# Patient Record
Sex: FEMALE | Race: WHITE | Employment: FULL TIME | URBAN - METROPOLITAN AREA
[De-identification: names, ages, dates, MRNs, and addresses within clinical notes are randomized per-mention and may not be internally consistent; named-entity substitution may affect disease eponyms.]

---

## 2019-11-15 ENCOUNTER — OFFICE VISIT (OUTPATIENT)
Dept: FAMILY MEDICINE CLINIC | Facility: CLINIC | Age: 24
End: 2019-11-15

## 2019-11-15 VITALS
RESPIRATION RATE: 16 BRPM | TEMPERATURE: 98.3 F | HEIGHT: 64 IN | DIASTOLIC BLOOD PRESSURE: 74 MMHG | SYSTOLIC BLOOD PRESSURE: 108 MMHG | BODY MASS INDEX: 30.85 KG/M2 | HEART RATE: 110 BPM | WEIGHT: 180.7 LBS | OXYGEN SATURATION: 98 %

## 2019-11-15 DIAGNOSIS — R53.82 CHRONIC FATIGUE: ICD-10-CM

## 2019-11-15 DIAGNOSIS — R73.9 HYPERGLYCEMIA: ICD-10-CM

## 2019-11-15 DIAGNOSIS — Z23 NEED FOR VACCINATION: ICD-10-CM

## 2019-11-15 DIAGNOSIS — Z00.00 ANNUAL PHYSICAL EXAM: Primary | ICD-10-CM

## 2019-11-15 DIAGNOSIS — R63.5 WEIGHT GAIN: ICD-10-CM

## 2019-11-15 PROCEDURE — 90674 CCIIV4 VAC NO PRSV 0.5 ML IM: CPT | Performed by: FAMILY MEDICINE

## 2019-11-15 PROCEDURE — 99204 OFFICE O/P NEW MOD 45 MIN: CPT | Performed by: FAMILY MEDICINE

## 2019-11-15 PROCEDURE — 99385 PREV VISIT NEW AGE 18-39: CPT | Performed by: FAMILY MEDICINE

## 2019-11-15 PROCEDURE — 90471 IMMUNIZATION ADMIN: CPT | Performed by: FAMILY MEDICINE

## 2019-11-15 PROCEDURE — 90472 IMMUNIZATION ADMIN EACH ADD: CPT | Performed by: FAMILY MEDICINE

## 2019-11-15 PROCEDURE — 90649 4VHPV VACCINE 3 DOSE IM: CPT | Performed by: FAMILY MEDICINE

## 2019-11-15 RX ORDER — DULOXETIN HYDROCHLORIDE 60 MG/1
60 CAPSULE, DELAYED RELEASE ORAL DAILY
COMMUNITY
End: 2021-06-21 | Stop reason: SDUPTHER

## 2019-11-15 RX ORDER — NORETHINDRONE ACETATE AND ETHINYL ESTRADIOL AND FERROUS FUMARATE 5-7-9-7
KIT ORAL DAILY
COMMUNITY
End: 2021-06-21

## 2019-11-15 NOTE — PROGRESS NOTES
Subjective   Veronica Monroe is a 24 y.o. female.     Chief Complaint   Patient presents with   • Establish Care     np   • Annual Exam     CPE   • Weight Gain     pt cannot explain weight gain despite working out 4 days a week and eating healthy        History of Present Illness   Patient presents to become established with PCP.  She reports unexplained weight gain, for the past year, an estimated 30 lbs. She used to be around 150lbs. She also reports fatigue, which she has had problems with since high school.   She has an ob-gyn, Dr. Agrawal at [facility]- saw her last year, has not gone back yet since she didn't have insurance for awhile. Per patient, Ob-gyn mentioned she may have PCOS.   She saw Dr. Yeh at [facility], [place] this past Monday. She had a transvaginal and pelvic US. - Will obtain results. No cysts were observed, although patient reports ovarian cysts on previous ultrasounds.     She is on OCP's- for the past 6 months. This has helped with her menorrhagia, dysmenorrhea, mood swings. She takes the birth control so she doesn't have menstrual cycles. Pap test last year. LMP- may 2019 (intentionally, with birth control).    She sees a psychiatrist earlier this year- [facility], and had lab work done to investigate fatigue.   She has been on Cymbalta for the past 3 years, and reports being on medications for anxiety since high school.  Had lab work done in June.    Will obtain results from ob-gyn and psychiatrist for review.  She does not drink caffeine, for the past few months- because it seemed to aggravate her anxiety, and made it difficult for her to sleep at night.     She works as a , is at a desk most of the day.   She lives with her boyfriend at home.    Surgical history- tonsillectomy age 4-5yrs.    FH-  Mother- uterine fibroid  Maternal aunt- endometriosis, diabetes  Other maternal aunt- hysterectomy in her 40's (unknown why)  Maternal grandmother- mastectomy for  breast cancer  Maternal great- grandmother- breast cancer  Father and paternal grandfather- diabetes  Maternal grandfather-diabetes    She exercises- strength training/weight lifting 3-4 times per week 30-45 minutes, some cycling, also does rock climbing every few weeks.  Her diet is healthy, she eats chicken, fish, lots of vegetables. Limits bread, carbs. Avoids dairy for the past 2 months. Has been trying to drink more water- 32-48oz water/day.   Does not drink soft drinks or alcohol.  She has never been a smoker.     History reviewed. No pertinent past medical history.  History reviewed. No pertinent surgical history.  Social History     Tobacco Use   • Smoking status: Never Smoker   • Smokeless tobacco: Never Used   Substance Use Topics   • Alcohol use: Yes   • Drug use: Not on file     History reviewed. No pertinent family history.    Review of Systems   Constitutional: Positive for fatigue and unexpected weight gain. Negative for activity change, appetite change, fever and unexpected weight loss.   HENT: Positive for postnasal drip (In the fall), rhinorrhea (In the fall) and sinus pressure (In the fall). Negative for ear pain, hearing loss, sore throat and trouble swallowing.    Respiratory: Positive for chest tightness. Negative for shortness of breath and wheezing.    Cardiovascular: Negative for chest pain, palpitations and leg swelling.   Gastrointestinal: Positive for GERD (Seldom). Negative for abdominal pain, constipation (Occasionally), diarrhea, nausea and vomiting.   Endocrine: Negative for polydipsia and polyuria.   Genitourinary: Negative for difficulty urinating, dysuria, menstrual problem and vaginal discharge.   Musculoskeletal: Negative for arthralgias and back pain.   Allergic/Immunologic: Positive for environmental allergies (Sees an allergy doctor).   Neurological: Negative for dizziness and headache.   Psychiatric/Behavioral: Negative for sleep disturbance and depressed mood. The patient is  "not nervous/anxious.        Objective   /74   Pulse 110   Temp 98.3 °F (36.8 °C) (Oral)   Resp 16   Ht 161.3 cm (63.5\")   Wt 82 kg (180 lb 11.2 oz)   SpO2 98%   BMI 31.51 kg/m²     Physical Exam   Constitutional: She appears well-developed and well-nourished. No distress.   HENT:   Head: Normocephalic.   Right Ear: Tympanic membrane, external ear and ear canal normal.   Left Ear: External ear normal. An impacted cerumen is present.  Mouth/Throat: Oropharynx is clear and moist.   Mallampati score 4   Eyes: Conjunctivae and EOM are normal. Pupils are equal, round, and reactive to light.   Neck: Normal range of motion. Neck supple.   Cardiovascular: Normal rate, regular rhythm, normal heart sounds and intact distal pulses.   No murmur heard.  Pulmonary/Chest: Effort normal and breath sounds normal. No respiratory distress. She has no wheezes. She has no rales.   Abdominal: Soft. Bowel sounds are normal. She exhibits no distension. There is no tenderness. There is no rebound and no guarding.   Musculoskeletal: Normal range of motion.   Lymphadenopathy:     She has no cervical adenopathy.   Neurological: She is alert.   Skin: Skin is warm and dry. Capillary refill takes less than 2 seconds.   Psychiatric: She has a normal mood and affect.   Vitals reviewed.      Procedures    Assessment/Plan   Veronica was seen today for establish care, annual exam and weight gain.    Diagnoses and all orders for this visit:    Annual physical exam  -     Comprehensive Metabolic Panel; Future  -     Lipid Panel; Future  -     Lipid Panel  -     Comprehensive Metabolic Panel    Hyperglycemia  -     Hemoglobin A1c    Chronic fatigue  -     CBC & Differential; Future  -     TSH  -     CBC & Differential  -     Ambulatory Referral to Sleep Medicine    Weight gain  -     TSH    Need for vaccination  -     Flucelvax Quad=>4Years (Vial)  -     HPV Vaccine QuadriValent 3 Dose IM     Patient Instructions   Lab work ordered, will call " with results.  Will obtain medical records from previous physicians.  Return to clinic in 6 months, and as needed.

## 2019-11-15 NOTE — PATIENT INSTRUCTIONS
Patient Instructions   Lab work ordered, will call with results.  Will obtain medical records from previous physicians.  Return to clinic in 6 months, and as needed.

## 2019-11-16 LAB
ALBUMIN SERPL-MCNC: 4.4 G/DL (ref 3.5–5.2)
ALBUMIN/GLOB SERPL: 1.5 G/DL
ALP SERPL-CCNC: 69 U/L (ref 39–117)
ALT SERPL-CCNC: 10 U/L (ref 1–33)
AST SERPL-CCNC: 15 U/L (ref 1–32)
BASOPHILS # BLD AUTO: 0.06 10*3/MM3 (ref 0–0.2)
BASOPHILS NFR BLD AUTO: 0.7 % (ref 0–1.5)
BILIRUB SERPL-MCNC: 0.2 MG/DL (ref 0.2–1.2)
BUN SERPL-MCNC: 6 MG/DL (ref 6–20)
BUN/CREAT SERPL: 13 (ref 7–25)
CALCIUM SERPL-MCNC: 9.7 MG/DL (ref 8.6–10.5)
CHLORIDE SERPL-SCNC: 104 MMOL/L (ref 98–107)
CHOLEST SERPL-MCNC: 181 MG/DL (ref 0–200)
CO2 SERPL-SCNC: 24.2 MMOL/L (ref 22–29)
CREAT SERPL-MCNC: 0.46 MG/DL (ref 0.57–1)
EOSINOPHIL # BLD AUTO: 0.13 10*3/MM3 (ref 0–0.4)
EOSINOPHIL NFR BLD AUTO: 1.5 % (ref 0.3–6.2)
ERYTHROCYTE [DISTWIDTH] IN BLOOD BY AUTOMATED COUNT: 13.2 % (ref 12.3–15.4)
GLOBULIN SER CALC-MCNC: 3 GM/DL
GLUCOSE SERPL-MCNC: 73 MG/DL (ref 65–99)
HBA1C MFR BLD: 5.5 % (ref 4.8–5.6)
HCT VFR BLD AUTO: 39.4 % (ref 34–46.6)
HDLC SERPL-MCNC: 50 MG/DL (ref 40–60)
HGB BLD-MCNC: 12.8 G/DL (ref 12–15.9)
IMM GRANULOCYTES # BLD AUTO: 0.03 10*3/MM3 (ref 0–0.05)
IMM GRANULOCYTES NFR BLD AUTO: 0.4 % (ref 0–0.5)
LDLC SERPL CALC-MCNC: 112 MG/DL (ref 0–100)
LYMPHOCYTES # BLD AUTO: 2.39 10*3/MM3 (ref 0.7–3.1)
LYMPHOCYTES NFR BLD AUTO: 28.3 % (ref 19.6–45.3)
MCH RBC QN AUTO: 28 PG (ref 26.6–33)
MCHC RBC AUTO-ENTMCNC: 32.5 G/DL (ref 31.5–35.7)
MCV RBC AUTO: 86.2 FL (ref 79–97)
MONOCYTES # BLD AUTO: 0.45 10*3/MM3 (ref 0.1–0.9)
MONOCYTES NFR BLD AUTO: 5.3 % (ref 5–12)
NEUTROPHILS # BLD AUTO: 5.4 10*3/MM3 (ref 1.7–7)
NEUTROPHILS NFR BLD AUTO: 63.8 % (ref 42.7–76)
NRBC BLD AUTO-RTO: 0 /100 WBC (ref 0–0.2)
PLATELET # BLD AUTO: 468 10*3/MM3 (ref 140–450)
POTASSIUM SERPL-SCNC: 4.7 MMOL/L (ref 3.5–5.2)
PROT SERPL-MCNC: 7.4 G/DL (ref 6–8.5)
RBC # BLD AUTO: 4.57 10*6/MM3 (ref 3.77–5.28)
SODIUM SERPL-SCNC: 140 MMOL/L (ref 136–145)
TRIGL SERPL-MCNC: 94 MG/DL (ref 0–150)
TSH SERPL DL<=0.005 MIU/L-ACNC: 2.44 UIU/ML (ref 0.27–4.2)
VLDLC SERPL CALC-MCNC: 18.8 MG/DL
WBC # BLD AUTO: 8.46 10*3/MM3 (ref 3.4–10.8)

## 2019-11-18 NOTE — PROGRESS NOTES
Left message on patient's voicemail. Will await callback. Will plan to discuss ordering sleep study with patient and increased exercise intensity for weight loss.

## 2019-12-13 ENCOUNTER — OFFICE VISIT (OUTPATIENT)
Dept: FAMILY MEDICINE CLINIC | Facility: CLINIC | Age: 24
End: 2019-12-13

## 2019-12-13 VITALS
HEIGHT: 64 IN | HEART RATE: 112 BPM | WEIGHT: 182.7 LBS | RESPIRATION RATE: 16 BRPM | OXYGEN SATURATION: 98 % | DIASTOLIC BLOOD PRESSURE: 90 MMHG | BODY MASS INDEX: 31.19 KG/M2 | SYSTOLIC BLOOD PRESSURE: 128 MMHG | TEMPERATURE: 98.5 F

## 2019-12-13 DIAGNOSIS — K21.9 GASTROESOPHAGEAL REFLUX DISEASE, ESOPHAGITIS PRESENCE NOT SPECIFIED: ICD-10-CM

## 2019-12-13 DIAGNOSIS — E66.9 CLASS 1 OBESITY WITHOUT SERIOUS COMORBIDITY WITH BODY MASS INDEX (BMI) OF 31.0 TO 31.9 IN ADULT, UNSPECIFIED OBESITY TYPE: ICD-10-CM

## 2019-12-13 DIAGNOSIS — R00.0 SINUS TACHYCARDIA: Primary | ICD-10-CM

## 2019-12-13 PROCEDURE — 99214 OFFICE O/P EST MOD 30 MIN: CPT | Performed by: FAMILY MEDICINE

## 2019-12-13 NOTE — PROGRESS NOTES
Subjective   Veronica Monroe is a 24 y.o. female.     Chief Complaint   Patient presents with   • Follow-up     [facility] sinusitis tacycardia and bronchitis        History of Present Illness   Patient presents for  follow up. She was seen and evaluated yesterday- diagnosed with sinusitis and bronchitis for which she is taking doxycycline as directed.  She was also noted to have tachycardia and recommended to go to the ER- she went to [facility] ER and was evaluated with EKG, Chest-X-ray and lab work which were unremarkable aside from sinus tachycardia of 112 bpm. (Results reviewed).  Her HR has been elevated at previous apt one month ago. Patient denies symptoms- no CP, heart palpitations, dyspnea. She states she is an anxious person.  She reports GERD symptoms that have increased recently to 3 times per week. She has not taken anything for it.   She states that she eats a healthy diet overall, avoids sugary drinks, and exercises frequently, doing 20 minutes of cardio most days of the week.       History reviewed. No pertinent past medical history.  History reviewed. No pertinent surgical history.  Social History     Tobacco Use   • Smoking status: Never Smoker   • Smokeless tobacco: Never Used   Substance Use Topics   • Alcohol use: Yes   • Drug use: Not on file     History reviewed. No pertinent family history.    Review of Systems   Constitutional: Negative for activity change, appetite change, fatigue, fever, unexpected weight gain and unexpected weight loss.   HENT: Positive for postnasal drip, rhinorrhea and sinus pressure. Negative for ear pain, sore throat and trouble swallowing.    Respiratory: Positive for cough. Negative for choking, chest tightness and shortness of breath.    Cardiovascular: Negative for chest pain, palpitations and leg swelling.   Gastrointestinal: Positive for GERD. Negative for abdominal pain, diarrhea, nausea and vomiting.       Objective   /90   Pulse 112   Temp 98.5 °F (36.9 °C)  "(Oral)   Resp 16   Ht 161.3 cm (63.5\")   Wt 82.9 kg (182 lb 11.2 oz)   SpO2 98%   BMI 31.86 kg/m²     Physical Exam   Constitutional: She appears well-developed and well-nourished. No distress.   Pleasant, obese female   HENT:   Head: Normocephalic.   Right Ear: External ear normal.   Left Ear: External ear normal.   Mouth/Throat: Oropharynx is clear and moist.   Eyes: Pupils are equal, round, and reactive to light. Conjunctivae and EOM are normal.   Neck: Normal range of motion. Neck supple.   Cardiovascular: Regular rhythm, normal heart sounds and intact distal pulses.   No murmur heard.  Sinus tachycardia    Pulmonary/Chest: Effort normal and breath sounds normal. No respiratory distress. She has no wheezes. She has no rales.   Musculoskeletal: Normal range of motion.   Neurological: She is alert.   Skin: Skin is warm and dry. Capillary refill takes less than 2 seconds.   Psychiatric: She has a normal mood and affect.   Vitals reviewed.      Procedures    Assessment/Plan   Veronica was seen today for follow-up.    Diagnoses and all orders for this visit:    Sinus tachycardia  -     Holter monitor - 48 hour; Future    Class 1 obesity without serious comorbidity with body mass index (BMI) of 31.0 to 31.9 in adult, unspecified obesity type    Gastroesophageal reflux disease, esophagitis presence not specified        Discussed patient's BMI of 31.9 and the importance of increasing exercise intensity with goal of weight loss which will help improve GERD symptoms and elevated BP. Patient is agreeable with this plan.   Will also order 48 hour holter monitor, and consider cardiology referral.   TSH level last month was normal.        Patient Instructions    Will order 48 hour holter monitor  Recommend prilosec for GERD symptoms  Recommend increasing exercise intensity and frequency, spin classes would be excellent, with the goal of weight loss of approximately 30lbs   Return to clinic as needed, and in 3 months " for follow up

## 2019-12-13 NOTE — PATIENT INSTRUCTIONS
Patient Instructions    Will order 48 hour holter monitor  Recommend prilosec for GERD symptoms  Recommend increasing exercise intensity and frequency, spin classes would be excellent, with the goal of weight loss of approximately 30lbs   Return to clinic as needed, and in 3 months for follow up

## 2020-03-05 ENCOUNTER — APPOINTMENT (OUTPATIENT)
Dept: SLEEP MEDICINE | Facility: HOSPITAL | Age: 25
End: 2020-03-05

## 2021-06-21 ENCOUNTER — OFFICE VISIT (OUTPATIENT)
Dept: INTERNAL MEDICINE | Facility: CLINIC | Age: 26
End: 2021-06-21

## 2021-06-21 VITALS
HEIGHT: 64 IN | WEIGHT: 213.2 LBS | DIASTOLIC BLOOD PRESSURE: 62 MMHG | HEART RATE: 111 BPM | SYSTOLIC BLOOD PRESSURE: 118 MMHG | OXYGEN SATURATION: 99 % | BODY MASS INDEX: 36.4 KG/M2

## 2021-06-21 DIAGNOSIS — F41.1 GAD (GENERALIZED ANXIETY DISORDER): ICD-10-CM

## 2021-06-21 DIAGNOSIS — Z00.00 ANNUAL PHYSICAL EXAM: Primary | ICD-10-CM

## 2021-06-21 DIAGNOSIS — K21.9 GASTROESOPHAGEAL REFLUX DISEASE WITHOUT ESOPHAGITIS: ICD-10-CM

## 2021-06-21 DIAGNOSIS — F42.9 OBSESSIVE-COMPULSIVE DISORDER, UNSPECIFIED TYPE: ICD-10-CM

## 2021-06-21 DIAGNOSIS — E66.9 OBESITY, CLASS II, BMI 35-39.9, ISOLATED: ICD-10-CM

## 2021-06-21 PROBLEM — Z80.3 FAMILY HISTORY OF BREAST CANCER IN FIRST DEGREE RELATIVE: Status: ACTIVE | Noted: 2020-02-06

## 2021-06-21 PROCEDURE — 99395 PREV VISIT EST AGE 18-39: CPT | Performed by: FAMILY MEDICINE

## 2021-06-21 RX ORDER — FLUOXETINE HYDROCHLORIDE 20 MG/1
CAPSULE ORAL
COMMUNITY
End: 2021-06-21

## 2021-06-21 RX ORDER — CETIRIZINE HYDROCHLORIDE 10 MG/1
10 TABLET ORAL DAILY
COMMUNITY

## 2021-06-21 RX ORDER — DULOXETIN HYDROCHLORIDE 60 MG/1
60 CAPSULE, DELAYED RELEASE ORAL DAILY
Qty: 90 CAPSULE | Refills: 4 | Status: SHIPPED | OUTPATIENT
Start: 2021-06-21

## 2021-06-21 RX ORDER — NORETHINDRONE ACETATE/ETHINYL ESTRADIOL 1.5-0.03MG
KIT ORAL
COMMUNITY
Start: 2021-06-16 | End: 2021-06-21

## 2021-06-21 RX ORDER — LEVOCETIRIZINE DIHYDROCHLORIDE 5 MG/1
TABLET, FILM COATED ORAL
COMMUNITY
End: 2021-06-21

## 2021-06-21 RX ORDER — MULTIPLE VITAMINS W/ MINERALS TAB 9MG-400MCG
1 TAB ORAL DAILY
COMMUNITY

## 2021-06-21 NOTE — PROGRESS NOTES
Date of Encounter: 2021  Patient: Veronica Monroe,  1995    Subjective   History of Presenting Illness  Chief complaint: Annual physical    No acute concerns.    Generalized anxiety disorder, obsessive-compulsive disorder: Doing well on duloxetine, has been on it since 2017.  No significant side effects.  Feels her mood is doing very well and her symptoms are controlled on this regimen.  Did not find benefit in talk therapy.    History of GERD, with no current symptoms.    Lives with partner.  No children.  Never a smoker.  Does not drink alcohol.  Eats a plant-based diet.  Exercises 4 times per week primarily by power lifting.  Last cervical cancer screening 2021 at [facility].  On combined OCPs.  Works as a , also in school online.  Does not have a living will.  Has had HPV series per her report, up-to-date on COVID-19 vaccine -card not available, due for Td vaccination.    Review of Systems:  Negative for fever, congestion, chest pain upon exertion, shortness of breath, vision changes, vomiting, dysuria, lymphadenopathy, muscle weakness, numbness, mood changes, rashes.    The following portions of the patient's history were reviewed and updated as appropriate: allergies, current medications, past family history, past medical history, past social history, past surgical history and problem list.    Patient Active Problem List   Diagnosis   • Family history of breast cancer in first degree relative   • JOSE (generalized anxiety disorder)   • OCD (obsessive compulsive disorder)   • Gastroesophageal reflux disease without esophagitis   • Obesity, Class II, BMI 35-39.9, isolated     Past Medical History:   Diagnosis Date   • Abnormal Pap smear of cervix     only at 20 y/o   • Anxiety    • Panic disorder    • Seasonal allergies      Past Surgical History:   Procedure Laterality Date   • TONSILLECTOMY       Family History   Problem Relation Age of Onset   • Endometriosis Mother    •  "Diabetes Father    • Depression Sister    • Endometriosis Maternal Aunt    • Breast cancer Maternal Grandmother    • Diabetes Maternal Grandfather    • Stroke Maternal Grandfather    • Diabetes Paternal Grandfather        Current Outpatient Medications:   •  cetirizine (zyrTEC) 10 MG tablet, Take 10 mg by mouth Daily., Disp: , Rfl:   •  DULoxetine (CYMBALTA) 60 MG capsule, Take 1 capsule by mouth Daily., Disp: 90 capsule, Rfl: 4  •  FLUTICASONE PROPIONATE EX, Apply  topically., Disp: , Rfl:   •  multivitamin with minerals (MULTIVITAMIN ADULTS PO), Take 1 tablet by mouth Daily., Disp: , Rfl:   •  Norethin Ace-Eth Estrad-FE (MICROGESTIN 24 FE PO), Take  by mouth., Disp: , Rfl:   •  Omega-3 Fatty Acids (OMEGA-3 PO), Take  by mouth., Disp: , Rfl:   Allergies   Allergen Reactions   • Penicillins Rash     Social History     Tobacco Use   • Smoking status: Never Smoker   • Smokeless tobacco: Never Used   Substance Use Topics   • Alcohol use: Yes   • Drug use: Not on file          Objective   Physical Exam  Vitals:    06/21/21 1456   BP: 118/62   Pulse: 111   SpO2: 99%   Weight: 96.7 kg (213 lb 3.2 oz)   Height: 162.6 cm (64\")     Body mass index is 36.6 kg/m².    Constitutional: NAD.  Eyes: EOMI. PERRLA. Normal conjunctiva.  Ear, nose, mouth, throat: No tonsillar exudates or erythema.   Normal nasal mucosa. Normal external ear canals and TMs bilaterally.  Cardiovascular: RRR. No murmurs. No LE edema b/l. Radial pulses 2+ bilaterally.  Pulmonary: CTA b/l. Good effort.  Integumentary: No rashes or wounds on face or upper extremities.  Lymphatic: No anterior cervical lymphadenopathy.  Endocrine: No thyromegaly or palpable thyroid nodules.  Psychiatric: Normal affect. Normal thought content.  Good insight and judgment.     Assessment/Plan   Assessment and Plan  Pleasant 26-year-old female with generalized anxiety disorder, obsessive-compulsive disorder, history of GERD, family history of breast cancer in first-degree relative, " who presents with the following:    Diagnoses and all orders for this visit:    1. Annual physical exam (Primary): We discussed preventative care including age and patient-appropriate immunizations and cancer screening. We also discussed the importance of exercise and a healthy diet. This is their annual preventative exam.  I recommended Td vaccination when financially feasible.    2. JOSE (generalized anxiety disorder): Controlled, continue current regimen  3. Obsessive-compulsive disorder, unspecified type  -     DULoxetine (CYMBALTA) 60 MG capsule; Take 1 capsule by mouth Daily.  Dispense: 90 capsule; Refill: 4    4. Gastroesophageal reflux disease without esophagitis: Resolved    5. Obesity, Class II, BMI 35-39.9, isolated: Patient reports recent blood work within the past year which demonstrated normal blood glucose, lipids, kidneys, and liver function tests.  She exercises by power lifting and has no history of hyperlipidemia, diabetes, or hypertension.  I do think that weight loss would be beneficial to her health long-term but she does have a history of borderline eating disorder, I recommend she focus instead on healthy behaviors and the weight will likely follow.  She does monitor her calories and exercise regularly with a .    Patient is cash pay, she does not currently have insurance due to job difficulties, paid $50 at the front, I am unsure about how billing works for this after that self pay payment so I will defer billing as an annual for what is overall a very straightforward visit and have coding/billing recommend alternatives if needed.  We will use [id number].    Rodrigo Mar MD  Family Medicine  O: [phone]  C: [phone]    Disclaimer: Parts of this note were dictated by speech recognition. Minor errors in transcription may be present. Please call if questions.

## 2021-08-18 ENCOUNTER — PATIENT MESSAGE (OUTPATIENT)
Dept: INTERNAL MEDICINE | Facility: CLINIC | Age: 26
End: 2021-08-18